# Patient Record
Sex: FEMALE | Race: WHITE | NOT HISPANIC OR LATINO | Employment: FULL TIME | URBAN - METROPOLITAN AREA
[De-identification: names, ages, dates, MRNs, and addresses within clinical notes are randomized per-mention and may not be internally consistent; named-entity substitution may affect disease eponyms.]

---

## 2023-06-12 ENCOUNTER — OFFICE VISIT (OUTPATIENT)
Dept: PRIMARY CARE | Facility: CLINIC | Age: 23
End: 2023-06-12
Payer: COMMERCIAL

## 2023-06-12 VITALS
HEART RATE: 68 BPM | DIASTOLIC BLOOD PRESSURE: 70 MMHG | BODY MASS INDEX: 23.19 KG/M2 | WEIGHT: 153 LBS | SYSTOLIC BLOOD PRESSURE: 106 MMHG | HEIGHT: 68 IN | TEMPERATURE: 96.9 F

## 2023-06-12 DIAGNOSIS — Z00.00 VISIT FOR PREVENTIVE HEALTH EXAMINATION: Primary | ICD-10-CM

## 2023-06-12 DIAGNOSIS — Z01.84 IMMUNITY STATUS TESTING: ICD-10-CM

## 2023-06-12 DIAGNOSIS — F41.1 GENERALIZED ANXIETY DISORDER: ICD-10-CM

## 2023-06-12 LAB
NON-UH HIE A/G RATIO: 1.3
NON-UH HIE ALB: 3.9 G/DL (ref 3.4–5)
NON-UH HIE ALK PHOS: 60 UNIT/L (ref 46–116)
NON-UH HIE BILIRUBIN, TOTAL: 0.4 MG/DL (ref 0.2–1)
NON-UH HIE BUN/CREAT RATIO: 12.2
NON-UH HIE BUN: 11 MG/DL (ref 9–23)
NON-UH HIE CALCIUM: 9.5 MG/DL (ref 8.7–10.4)
NON-UH HIE CALCULATED LDL CHOLESTEROL: 79 MG/DL (ref 60–130)
NON-UH HIE CALCULATED OSMOLALITY: 284 MOSM/KG (ref 275–295)
NON-UH HIE CHLORIDE: 109 MMOL/L (ref 98–107)
NON-UH HIE CHOLESTEROL: 135 MG/DL (ref 100–200)
NON-UH HIE CO2, VENOUS: 26 MMOL/L (ref 20–31)
NON-UH HIE CREATININE: 0.9 MG/DL (ref 0.5–0.8)
NON-UH HIE GFR AA: >60
NON-UH HIE GLOBULIN: 2.9 G/DL
NON-UH HIE GLOMERULAR FILTRATION RATE: >60 ML/MIN/1.73M?
NON-UH HIE GLUCOSE: 84 MG/DL (ref 74–106)
NON-UH HIE GOT: 19 UNIT/L (ref 15–37)
NON-UH HIE GPT: 19 UNIT/L (ref 10–49)
NON-UH HIE HCT: 38.9 % (ref 36–46)
NON-UH HIE HDL CHOLESTEROL: 45 MG/DL (ref 40–60)
NON-UH HIE HEPATITIS B SURFACE ANTIBODY: REACTIVE
NON-UH HIE HGB: 13.1 G/DL (ref 12–16)
NON-UH HIE INSTR WBC ND: 5.1
NON-UH HIE K: 4.6 MMOL/L (ref 3.5–5.1)
NON-UH HIE MCH: 27.4 PG (ref 27–34)
NON-UH HIE MCHC: 33.7 G/DL (ref 32–37)
NON-UH HIE MCV: 81.2 FL (ref 80–100)
NON-UH HIE MPV: 8.5 FL (ref 7.4–10.4)
NON-UH HIE NA: 143 MMOL/L (ref 135–145)
NON-UH HIE PLATELET: 217 X10 (ref 150–450)
NON-UH HIE RBC: 4.79 X10 (ref 4.2–5.4)
NON-UH HIE RDW: 13.1 % (ref 11.5–14.5)
NON-UH HIE TOTAL CHOL/HDL CHOL RATIO: 3
NON-UH HIE TOTAL PROTEIN: 6.8 G/DL (ref 5.7–8.2)
NON-UH HIE TRIGLYCERIDES: 55 MG/DL (ref 30–150)
NON-UH HIE WBC: 5.1 X10 (ref 4.5–11)

## 2023-06-12 PROCEDURE — 90471 IMMUNIZATION ADMIN: CPT | Performed by: FAMILY MEDICINE

## 2023-06-12 PROCEDURE — 1036F TOBACCO NON-USER: CPT | Performed by: FAMILY MEDICINE

## 2023-06-12 PROCEDURE — 99395 PREV VISIT EST AGE 18-39: CPT | Performed by: FAMILY MEDICINE

## 2023-06-12 PROCEDURE — 99212 OFFICE O/P EST SF 10 MIN: CPT | Performed by: FAMILY MEDICINE

## 2023-06-12 PROCEDURE — 90715 TDAP VACCINE 7 YRS/> IM: CPT | Performed by: FAMILY MEDICINE

## 2023-06-12 RX ORDER — ESCITALOPRAM OXALATE 5 MG/1
5 TABLET ORAL DAILY
Qty: 30 TABLET | Refills: 2 | Status: SHIPPED | OUTPATIENT
Start: 2023-06-12 | End: 2023-09-10

## 2023-06-12 ASSESSMENT — PROMIS GLOBAL HEALTH SCALE
RATE_SOCIAL_SATISFACTION: GOOD
CARRYOUT_SOCIAL_ACTIVITIES: GOOD
EMOTIONAL_PROBLEMS: OFTEN
RATE_AVERAGE_FATIGUE: MILD
RATE_GENERAL_HEALTH: GOOD
CARRYOUT_PHYSICAL_ACTIVITIES: COMPLETELY
RATE_PHYSICAL_HEALTH: GOOD
RATE_QUALITY_OF_LIFE: GOOD
RATE_AVERAGE_PAIN: 0
RATE_MENTAL_HEALTH: FAIR

## 2023-06-12 ASSESSMENT — PATIENT HEALTH QUESTIONNAIRE - PHQ9
SUM OF ALL RESPONSES TO PHQ9 QUESTIONS 1 AND 2: 0
1. LITTLE INTEREST OR PLEASURE IN DOING THINGS: NOT AT ALL
2. FEELING DOWN, DEPRESSED OR HOPELESS: NOT AT ALL

## 2023-06-12 NOTE — PROGRESS NOTES
Subjective   Patient ID: Yaz Alvares is a 23 y.o. female who presents for Annual Exam (Would like to get blood work.).  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 Symptoms include nervous/anxious behavior and palpitations. Patient reports no chest pain, compulsions, confusion, decreased concentration, depressed mood, dizziness, dry mouth, excessive worry, feeling of choking, hyperventilation, impotence, insomnia, irritability, malaise, muscle tension, nausea, obsessions, panic, restlessness, shortness of breath or suicidal ideas. Symptoms occur most days. The severity of symptoms is mild. The quality of sleep is fair. Nighttime awakenings: occasional.     Compliance with medications is %.       Review of Systems   Constitutional:  Negative for irritability.   Respiratory:  Negative for shortness of breath.    Cardiovascular:  Positive for palpitations. Negative for chest pain.   Gastrointestinal:  Negative for nausea.   Genitourinary:  Negative for impotence.   Neurological:  Negative for dizziness.   Psychiatric/Behavioral:  Negative for confusion, decreased concentration and suicidal ideas. The patient is nervous/anxious. The patient does not have insomnia.      Constitutional: no chills, no fever and no night sweats.   Eyes: no blurred  "vision and no eyesight problems.   ENT: no hearing loss, no nasal congestion, no nasal discharge, no hoarseness and no sore throat.   Cardiovascular: no chest pain, no intermittent leg claudication, no lower extremity edema, no palpitations and no syncope.   Respiratory: no cough, no shortness of breath during exertion, no shortness of breath at rest and no wheezing.   Gastrointestinal: no abdominal pain, no blood in stools, no constipation, no diarrhea, no melena, no nausea, no rectal pain and no vomiting.   Genitourinary: no dysuria, no change in urinary frequency, no urinary hesitancy, no feelings of urinary urgency and no vaginal discharge.   Musculoskeletal: no arthralgias,  no back pain and no myalgias.   Integumentary: no new skin lesions and no rashes.   Neurological: no difficulty walking, no headache, no limb weakness, no numbness and no tingling.   Psychiatric: no anxiety, no depression, no anhedonia and no substance use disorders.   Endocrine: no recent weight gain and no recent weight loss.   Hematologic/Lymphatic: no tendency for easy bruising and no swollen glands .    Objective    /70   Pulse 68   Temp 36.1 °C (96.9 °F)   Ht 1.727 m (5' 8\")   Wt 69.4 kg (153 lb)   BMI 23.26 kg/m²    Physical Exam  The patient appeared well nourished and normally developed. Vital signs as documented. Head exam is unremarkable. No scleral icterus or corneal arcus noted.  Pupils are equal round reactive to light extraocular movements are intact no hemorrhages noted on funduscopic exam mouth mucous membranes are moist no exudates ears canals clear TMs are gray pearly not injected nose no rhinorrhea or epistaxis Neck is without jugular venous distension, thyromegaly, or carotid bruits. Carotid upstrokes are brisk bilaterally. Lungs are clear to auscultation and percussion. Cardiac exam reveals the PMI to be normally sized and situated. Rhythm is regular. First and second heart sounds normal. No murmurs, rubs or " gallops. Abdominal exam reveals normal bowel sounds, no masses, no organomegaly and no aortic enlargement. Extremities are nonedematous and both femoral and pedal pulses are normal.  Neurologic exam DTRs are equal bilaterally no focal deficits strength is symmetrical heme lymph no palpable lymph nodes in the neck axilla or groin    Assessment/Plan   Problem List Items Addressed This Visit       Visit for preventive health examination - Primary     Continue annual exams         Relevant Orders    Comprehensive Metabolic Panel    Lipid Panel    Hemoglobin A1C    CBC    Generalized anxiety disorder     Not well controlled  Begin Lexapro 5 mg  Follow-up in 16 weeks to make sure the medication is working         Relevant Medications    escitalopram (Lexapro) 5 mg tablet     Other Visit Diagnoses       Immunity status testing        Relevant Orders    Hepatitis B surface antibody                 Katt Crane MD

## 2023-06-14 NOTE — RESULT ENCOUNTER NOTE
Please call patient I reviewed her bloodwork and her liver and kidney function is normal her electrolytes are normal and her blood sugar is normal  Her complete blood count shows no anemia leukemia or infection and her cholesterol panel is normal range  The elevated chloride is nothing to be worried about  Her hepatitis B is reactive which means she has immunity and does not need a vaccination  I am happy with the results please tell her to call if she needs anything

## 2023-06-16 ENCOUNTER — TELEPHONE (OUTPATIENT)
Dept: PRIMARY CARE | Facility: CLINIC | Age: 23
End: 2023-06-16
Payer: COMMERCIAL

## 2023-06-16 NOTE — TELEPHONE ENCOUNTER
----- Message from Katt Crane MD sent at 6/14/2023  8:51 AM EDT -----  Please call patient I reviewed her bloodwork and her liver and kidney function is normal her electrolytes are normal and her blood sugar is normal  Her complete blood count shows no anemia leukemia or infection and her cholesterol panel is normal range  The elevated chloride is nothing to be worried about  Her hepatitis B is reactive which means she has immunity and does not need a vaccination  I am happy with the results please tell her to call if she needs anything

## 2023-06-26 ENCOUNTER — TELEPHONE (OUTPATIENT)
Dept: PRIMARY CARE | Facility: CLINIC | Age: 23
End: 2023-06-26
Payer: COMMERCIAL

## 2023-06-26 PROBLEM — K63.5 COLON POLYP: Status: ACTIVE | Noted: 2022-09-16
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
## 2023-06-26 PROBLEM — R14.0 ABDOMINAL BLOATING: Status: RESOLVED | Noted: 2023-06-25 | Resolved: 2023-06-25

## 2023-06-26 PROBLEM — M62.9 HAMSTRING TIGHTNESS OF BOTH LOWER EXTREMITIES: Status: RESOLVED | Noted: 2023-06-26 | Resolved: 2023-06-26

## 2023-06-26 PROBLEM — R41.840 DIFFICULTY CONCENTRATING: Status: RESOLVED | Noted: 2023-06-26 | Resolved: 2023-06-26

## 2023-06-26 PROBLEM — Z00.00 VISIT FOR PREVENTIVE HEALTH EXAMINATION: Status: ACTIVE | Noted: 2023-06-26

## 2023-06-26 PROBLEM — F32.A MILD DEPRESSION: Status: ACTIVE | Noted: 2023-06-26

## 2023-06-26 PROBLEM — N94.6 DYSMENORRHEA: Status: ACTIVE | Noted: 2023-06-26

## 2023-06-26 PROBLEM — F41.1 GENERALIZED ANXIETY DISORDER: Status: ACTIVE | Noted: 2023-06-26

## 2023-06-26 PROBLEM — J30.1 ALLERGIC RHINITIS DUE TO POLLEN: Status: ACTIVE | Noted: 2023-06-25

## 2023-06-26 PROBLEM — M48.061 FORAMINAL STENOSIS OF LUMBAR REGION: Status: ACTIVE | Noted: 2023-06-26

## 2023-06-26 PROBLEM — K30 NONULCER DYSPEPSIA: Status: RESOLVED | Noted: 2022-09-16 | Resolved: 2023-06-26

## 2023-06-26 PROBLEM — M54.2 NECK PAIN: Status: RESOLVED | Noted: 2023-06-26 | Resolved: 2023-06-26

## 2023-06-26 PROBLEM — M51.26 LUMBAR HERNIATED DISC: Status: RESOLVED | Noted: 2023-06-26 | Resolved: 2023-06-26

## 2023-06-26 PROBLEM — M25.561 ACUTE PAIN OF RIGHT KNEE: Status: RESOLVED | Noted: 2023-06-25 | Resolved: 2023-06-25

## 2023-06-26 RX ORDER — TRETINOIN 0.25 MG/G
CREAM TOPICAL
COMMUNITY
Start: 2023-05-18

## 2023-06-26 RX ORDER — ETONOGESTREL AND ETHINYL ESTRADIOL VAGINAL RING .015; .12 MG/D; MG/D
1 RING VAGINAL
COMMUNITY
Start: 2023-05-17 | End: 2023-12-04

## 2023-06-26 ASSESSMENT — ENCOUNTER SYMPTOMS
SHORTNESS OF BREATH: 0
CONFUSION: 0
MUSCLE TENSION: 0
PANIC: 0
DECREASED CONCENTRATION: 0
IRRITABILITY: 0
DIZZINESS: 0
INSOMNIA: 0
RESTLESSNESS: 0
THOUGHT CONTENT - OBSESSIONS: 0
HYPERVENTILATION: 0
COMPULSIONS: 0
NAUSEA: 0
NERVOUS/ANXIOUS: 1
MALAISE: 0
DEPRESSED MOOD: 0
FEELING OF CHOKING: 0
PALPITATIONS: 1

## 2023-06-26 NOTE — TELEPHONE ENCOUNTER
Dr. Crane prescribed the patient lexipro and she is having heart palpitations she would like another medication prescribed in its place. She does not want to go to er.

## 2023-06-26 NOTE — TELEPHONE ENCOUNTER
I told pt to stop taking lexapro and see if the heart palpations stop and to call back in 3 days if they dont stop.     If they do she would like a different script.

## 2023-06-27 NOTE — ASSESSMENT & PLAN NOTE
Not well controlled  Begin Lexapro 5 mg  Follow-up in 16 weeks to make sure the medication is working

## 2023-06-27 NOTE — PATIENT INSTRUCTIONS
Today you had your annual wellness exam and were evaluated for generalized anxiety  Your exam is normal I recommend biometric screening  We will check your hepatitis B status  Generalized anxiety this is not uncommon when in graduate school  Begin treatment with Lexapro 5 mg this is a low enough dose  There can be side effects that can occur  Follow-up in 6 to 8 weeks to make sure the medicine is working for you  If you develop side effects in the meantime please call  Usually they dissipate after about 2 weeks

## 2023-07-11 ENCOUNTER — OFFICE VISIT (OUTPATIENT)
Dept: PRIMARY CARE | Facility: CLINIC | Age: 23
End: 2023-07-11
Payer: COMMERCIAL

## 2023-07-11 VITALS
TEMPERATURE: 97.5 F | DIASTOLIC BLOOD PRESSURE: 70 MMHG | HEART RATE: 81 BPM | SYSTOLIC BLOOD PRESSURE: 109 MMHG | WEIGHT: 148 LBS | BODY MASS INDEX: 22.5 KG/M2

## 2023-07-11 DIAGNOSIS — R00.2 PALPITATIONS: Primary | ICD-10-CM

## 2023-07-11 PROBLEM — I49.1 PAC (PREMATURE ATRIAL CONTRACTION): Status: ACTIVE | Noted: 2023-07-03

## 2023-07-11 PROCEDURE — 99214 OFFICE O/P EST MOD 30 MIN: CPT | Performed by: FAMILY MEDICINE

## 2023-07-11 PROCEDURE — 1036F TOBACCO NON-USER: CPT | Performed by: FAMILY MEDICINE

## 2023-07-11 ASSESSMENT — PATIENT HEALTH QUESTIONNAIRE - PHQ9
1. LITTLE INTEREST OR PLEASURE IN DOING THINGS: NOT AT ALL
2. FEELING DOWN, DEPRESSED OR HOPELESS: NOT AT ALL
SUM OF ALL RESPONSES TO PHQ9 QUESTIONS 1 AND 2: 0

## 2023-07-11 NOTE — PROGRESS NOTES
Subjective   Patient ID: Yaz Alvares is a 23 y.o. female who presents for Follow-up ( New Medication is giving pt heart palpitation.pt stop the medication and is still getting heart palpitation. ).  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 The current episode started 1 to 4 weeks ago. The problem occurs daily. The problem has been gradually worsening. The symptoms are aggravated by caffeine and stress. Pertinent negatives include no anxiety, chest fullness, chest pain, coughing, diaphoresis, dizziness, fever, irregular heartbeat, malaise/fatigue, nausea, near-syncope, numbness, shortness of breath, syncope, vomiting or weakness. She has tried anxiolytics for the symptoms. The treatment provided no relief. Risk factors include stress and family history. There is no history of anemia, anxiety, drug use, heart disease, hyperthyroidism or a valve disorder.       Review of Systems   Constitutional:  Negative for diaphoresis, fever and malaise/fatigue.   Respiratory:  Negative for cough and shortness of breath.    Cardiovascular:  Positive for palpitations. Negative for chest pain, syncope and near-syncope.   Gastrointestinal:  Negative for nausea and vomiting.   Neurological:  Negative for dizziness, weakness and numbness.   Psychiatric/Behavioral:  The patient is not nervous/anxious.      Constitutional: no chills, no fever and no night sweats.   Eyes: no blurred vision and no eyesight problems.    ENT: no hearing loss, no nasal congestion, no nasal discharge, no hoarseness and no sore throat.   Cardiovascular: no chest pain, no intermittent leg claudication, no lower extremity edema, no palpitations and no syncope.   Respiratory: no cough, no shortness of breath during exertion, no shortness of breath at rest and no wheezing.   Gastrointestinal: no abdominal pain, no blood in stools, no constipation, no diarrhea, no melena, no nausea, no rectal pain and no vomiting.   Genitourinary: no dysuria, no change in urinary frequency, no urinary hesitancy, no feelings of urinary urgency and no vaginal discharge.   Musculoskeletal: no arthralgias,  no back pain and no myalgias.   Integumentary: no new skin lesions and no rashes.   Neurological: no difficulty walking, no headache, no limb weakness, no numbness and no tingling.   Psychiatric: no anxiety, no depression, no anhedonia and no substance use disorders.   Endocrine: no recent weight gain and no recent weight loss.   Hematologic/Lymphatic: no tendency for easy bruising and no swollen glands .    Objective    /70   Pulse 81   Temp 36.4 °C (97.5 °F)   Wt 67.1 kg (148 lb)   BMI 22.50 kg/m²    Physical Exam  The patient appeared well nourished and normally developed. Vital signs as documented. Head exam is unremarkable. No scleral icterus or corneal arcus noted.  Pupils are equal round reactive to light extraocular movements are intact no hemorrhages noted on funduscopic exam mouth mucous membranes are moist no exudates ears canals clear TMs are gray pearly not injected nose no rhinorrhea or epistaxis Neck is without jugular venous distension, thyromegaly, or carotid bruits. Carotid upstrokes are brisk bilaterally. Lungs are clear to auscultation and percussion. Cardiac exam reveals the PMI to be normally sized and situated. Rhythm is regular. First and second heart sounds normal. No murmurs, rubs or gallops. Abdominal exam reveals normal bowel sounds,  no masses, no organomegaly and no aortic enlargement. Extremities are nonedematous and both femoral and pedal pulses are normal.  Neurologic exam DTRs are equal bilaterally no focal deficits strength is symmetrical heme lymph no palpable lymph nodes in the neck axilla or groin    Assessment/Plan   Problem List Items Addressed This Visit       Palpitations - Primary    Relevant Orders    Holter or Event Cardiac Monitor    Referral to Cardiology    Transthoracic Echo (TTE) Complete            Katt Crane MD

## 2023-07-27 PROBLEM — R00.2 PALPITATIONS: Status: ACTIVE | Noted: 2023-07-27

## 2023-07-27 ASSESSMENT — ENCOUNTER SYMPTOMS
DIAPHORESIS: 0
FEVER: 0
NERVOUS/ANXIOUS: 0
NUMBNESS: 0
CHEST PRESSURE: 0
WEAKNESS: 0
PALPITATIONS: 1
VOMITING: 0
NEAR-SYNCOPE: 0
COUGH: 0
DIZZINESS: 0
IRREGULAR HEARTBEAT: 0
NAUSEA: 0
SHORTNESS OF BREATH: 0
SYNCOPE: 0

## 2023-07-27 NOTE — PATIENT INSTRUCTIONS
I recommend if possible lowering caffeine intake, continue staying well hydrated   I recommend cardiac evaluation to determine if intrinsic cardiac issue causing the palpitations  Holter monitor to evaluate cardiac function and echo to evaluate for structural issues  Continue to monitor symptoms   Please call if you need anything

## 2023-12-04 ENCOUNTER — OFFICE VISIT (OUTPATIENT)
Dept: CARDIOLOGY | Facility: CLINIC | Age: 23
End: 2023-12-04
Payer: COMMERCIAL

## 2023-12-04 VITALS
HEART RATE: 76 BPM | SYSTOLIC BLOOD PRESSURE: 114 MMHG | DIASTOLIC BLOOD PRESSURE: 63 MMHG | OXYGEN SATURATION: 97 % | HEIGHT: 68 IN | WEIGHT: 156.8 LBS | BODY MASS INDEX: 23.76 KG/M2

## 2023-12-04 DIAGNOSIS — I49.1 PAC (PREMATURE ATRIAL CONTRACTION): Chronic | ICD-10-CM

## 2023-12-04 DIAGNOSIS — R00.2 PALPITATIONS: Primary | Chronic | ICD-10-CM

## 2023-12-04 PROBLEM — Z00.00 VISIT FOR PREVENTIVE HEALTH EXAMINATION: Status: RESOLVED | Noted: 2023-06-26 | Resolved: 2023-12-04

## 2023-12-04 PROBLEM — K63.5 COLON POLYP: Status: RESOLVED | Noted: 2022-09-16 | Resolved: 2023-12-04

## 2023-12-04 PROBLEM — J30.1 ALLERGIC RHINITIS DUE TO POLLEN: Status: RESOLVED | Noted: 2023-06-25 | Resolved: 2023-12-04

## 2023-12-04 PROCEDURE — 93010 ELECTROCARDIOGRAM REPORT: CPT | Performed by: INTERNAL MEDICINE

## 2023-12-04 PROCEDURE — 99213 OFFICE O/P EST LOW 20 MIN: CPT | Performed by: INTERNAL MEDICINE

## 2023-12-04 PROCEDURE — 99203 OFFICE O/P NEW LOW 30 MIN: CPT | Performed by: INTERNAL MEDICINE

## 2023-12-04 PROCEDURE — 1036F TOBACCO NON-USER: CPT | Performed by: INTERNAL MEDICINE

## 2023-12-04 PROCEDURE — 93005 ELECTROCARDIOGRAM TRACING: CPT | Performed by: INTERNAL MEDICINE

## 2023-12-04 NOTE — PROGRESS NOTES
Referred by No ref. provider found    HPI I am seeing Solange at the request of Dr. Crane for evaluation of palpitations.  She is 23.  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  She has been having anxiety over the last year.  She is a nursing student.  She was started on Lexapro in June.  She immediately started feeling palpitations at that time.  The Lexapro was stopped after a week and yet she is continue to feel palpitations.  Her father did put her on a telemetry pack and she was identified as having PACs.  She was seen in the emergency room in July and was found to have PACs correlating with her symptoms.  She had an echo done at [facility] which revealed a structurally normal heart.  She did have a Holter which I do not have the results for.  She continues to have symptoms of anxiety.  She has occasional panic attacks.  Her palpitations occur almost 10 times an hour.  She is done a great job of logging these and her phone.  She is physically active but notAs active as she was when she was playing collegiate basketball.  No syncope or presyncope no chest pain.    Past Medical History:  Problem List Items Addressed This Visit    None       Past Medical History:   Diagnosis Date    Acute pain of right knee 06/25/2023    Acute upper respiratory infection, unspecified 05/18/2018    Acute upper respiratory infection    Acute upper respiratory infection, unspecified 02/19/2016    URTI (acute upper respiratory infection)    Anorexia 03/22/2019    Loss of appetite for more than 2 weeks    Difficulty concentrating 06/26/2023    Encounter for screening for infections with a predominantly sexual mode of transmission 06/13/2018    Screening for STD (sexually transmitted disease)    Enlarged lymph nodes, unspecified 01/17/2017    Lymph node enlargement    Generalized abdominal pain 03/22/2019    Generalized abdominal pain    Internal derangement of ankle 05/09/2013    Low back pain 08/24/2015    Lumbar  herniated disc 06/26/2023    Neck pain 06/26/2023    Nonulcer dyspepsia 09/16/2022    Other viral warts 05/17/2016    Common wart    Overuse injury 06/26/2023    Personal history of diseases of the skin and subcutaneous tissue 05/12/2014    History of keloid of skin    Personal history of other diseases of the musculoskeletal system and connective tissue     History of low back pain    Personal history of other diseases of the musculoskeletal system and connective tissue 05/31/2016    History of backache    Personal history of other diseases of the respiratory system 03/09/2015    History of acute pharyngitis    Personal history of other specified conditions 05/18/2018    History of chest pain    Sprain of ligament of lumbosacral joint 08/24/2015    Stress fracture of calcaneus 05/23/2013             Past Surgical History:  She has a past surgical history that includes [place] tooth extraction.      Social History:  She reports that she has never smoked. She has never used smokeless tobacco. She reports that she does not currently use alcohol. She reports that she does not use drugs.    Family History:  Family History   Problem Relation Name Age of Onset    Hypertension Father guerita     Hyperlipidemia Father guerita         Allergies:  Patient has no known allergies.    Outpatient Medications:  Current Outpatient Medications   Medication Instructions    escitalopram (LEXAPRO) 5 mg, oral, Daily    etonogestreL-ethinyl estradioL (Nuvaring) 0.12-0.015 mg/24 hr vaginal ring 1 each, miscellaneous, Every 28 days    tretinoin (Retin-A) 0.025 % cream APPLY  A PEA SIZED AMOUNT TO ENTIRE FACE AT BEDTIME. START WITH ONCE A WEEK AND INCREASE AS TOLERATED. CAN MOISTURIZE WITH TOPICAL.        Last Recorded Vitals:  There were no vitals filed for this visit.    Physical Exam    Physical  Patient is alert and oriented x3.  HEENT is unremarkable mucous members are moist  Neck no JVP no bruits upstrokes are full no thyromegaly  Lungs are  "clear bilaterally.  No wheezing crackles or rales  Heart regular rhythm normal S1-S2 there is no S3 no murmurs are heard.  Abdomen is soft vessels are positive nontender nondistended no organomegaly no pulsatile masses  Extremities have no edema.  Distal pulses present palpable.  Neuro is grossly nonfocal  Skin has no rashes     Last Labs:  CBC -  Lab Results   Component Value Date    WBC 7.3 08/10/2022    HGB 12.1 08/10/2022    HCT 37.2 08/10/2022    MCV 85 08/10/2022     08/10/2022       CMP -  Lab Results   Component Value Date    CALCIUM 9.8 08/10/2022    PROT 6.7 08/10/2022    ALBUMIN 4.6 08/10/2022    AST 29 08/10/2022    ALT 41 08/10/2022    ALKPHOS 85 08/10/2022    BILITOT 0.5 08/10/2022       LIPID PANEL -   Lab Results   Component Value Date    CHOL 132 08/10/2022    HDL 47.6 08/10/2022    CHHDL 2.8 08/10/2022    VLDL 10 08/10/2022    TRIG 49 08/10/2022    NHDL 84 08/10/2022       RENAL FUNCTION PANEL -   Lab Results   Component Value Date    K 4.3 08/10/2022       No results found for: \"BNP\", \"HGBA1C\"       Echo  [facility] 7/2023 NL     Assessment/Plan   Palp-these are most certainly caused by benign PACs which were documented by her father on telemetry unit and also in the ER.  Her heart is structurally normal.  I have attempted to offer her reassurance.  I would not pursue any further therapy for PACs such as beta-blockade and/or more invasive strategy such as ablation.  Management of anxiety I think would go a long way in helping her.  Increasing her activity and exercise will be of benefit.  Considering alternative anxiolytics other than Lexapro such as Cymbalta would also be reasonable.  I am not concerned these will cause more PACs because she is having them even being off of these agents.    An EKG will be done today so I can review it.  My plan will be to see her back in the future as needed.  I am hopeful that offering reassurance is sufficient    Temo Diaz MD     Instructions and follow " up

## 2023-12-09 LAB
ATRIAL RATE: 65 BPM
P AXIS: 10 DEGREES
P OFFSET: 188 MS
P ONSET: 136 MS
PR INTERVAL: 170 MS
Q ONSET: 221 MS
QRS COUNT: 11 BEATS
QRS DURATION: 80 MS
QT INTERVAL: 400 MS
QTC CALCULATION(BAZETT): 416 MS
QTC FREDERICIA: 410 MS
R AXIS: 81 DEGREES
T AXIS: 47 DEGREES
T OFFSET: 421 MS
VENTRICULAR RATE: 65 BPM

## 2024-02-06 ENCOUNTER — LAB REQUISITION (OUTPATIENT)
Dept: LAB | Facility: HOSPITAL | Age: 24
End: 2024-02-06
Payer: COMMERCIAL

## 2024-02-06 DIAGNOSIS — Z20.2 CONTACT WITH AND (SUSPECTED) EXPOSURE TO INFECTIONS WITH A PREDOMINANTLY SEXUAL MODE OF TRANSMISSION: ICD-10-CM

## 2024-02-06 LAB
HIV 1+2 AB+HIV1 P24 AG SERPL QL IA: NONREACTIVE
TREPONEMA PALLIDUM IGG+IGM AB [PRESENCE] IN SERUM OR PLASMA BY IMMUNOASSAY: NONREACTIVE

## 2024-02-06 PROCEDURE — 87389 HIV-1 AG W/HIV-1&-2 AB AG IA: CPT

## 2024-02-06 PROCEDURE — 86780 TREPONEMA PALLIDUM: CPT

## 2024-02-06 PROCEDURE — 87661 TRICHOMONAS VAGINALIS AMPLIF: CPT

## 2024-02-06 PROCEDURE — 87800 DETECT AGNT MULT DNA DIREC: CPT

## 2024-02-07 LAB
C TRACH RRNA SPEC QL NAA+PROBE: NEGATIVE
N GONORRHOEA DNA SPEC QL PROBE+SIG AMP: NEGATIVE
T VAGINALIS RRNA SPEC QL NAA+PROBE: NEGATIVE